# Patient Record
Sex: FEMALE | Race: BLACK OR AFRICAN AMERICAN | NOT HISPANIC OR LATINO | Employment: FULL TIME | URBAN - METROPOLITAN AREA
[De-identification: names, ages, dates, MRNs, and addresses within clinical notes are randomized per-mention and may not be internally consistent; named-entity substitution may affect disease eponyms.]

---

## 2021-12-17 ENCOUNTER — IMMUNIZATION (OUTPATIENT)
Dept: PRIMARY CARE CLINIC | Facility: CLINIC | Age: 61
End: 2021-12-17
Payer: COMMERCIAL

## 2021-12-17 DIAGNOSIS — Z23 NEED FOR VACCINATION: Primary | ICD-10-CM

## 2021-12-17 PROCEDURE — 0064A COVID-19, MRNA, LNP-S, PF, 100 MCG/0.25 ML DOSE VACCINE (MODERNA BOOSTER): CPT | Mod: PBBFAC,CV19 | Performed by: INTERNAL MEDICINE

## 2022-03-31 ENCOUNTER — OFFICE VISIT (OUTPATIENT)
Dept: URGENT CARE | Facility: CLINIC | Age: 62
End: 2022-03-31
Payer: COMMERCIAL

## 2022-03-31 VITALS
DIASTOLIC BLOOD PRESSURE: 70 MMHG | OXYGEN SATURATION: 98 % | TEMPERATURE: 99 F | HEIGHT: 67 IN | SYSTOLIC BLOOD PRESSURE: 116 MMHG | HEART RATE: 68 BPM | WEIGHT: 232 LBS | RESPIRATION RATE: 18 BRPM | BODY MASS INDEX: 36.41 KG/M2

## 2022-03-31 DIAGNOSIS — K42.9 UMBILICAL HERNIA WITHOUT OBSTRUCTION OR GANGRENE: Primary | ICD-10-CM

## 2022-03-31 DIAGNOSIS — R10.9 ABDOMINAL PAIN, UNSPECIFIED ABDOMINAL LOCATION: ICD-10-CM
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
## 2022-03-31 LAB
BILIRUB UR QL STRIP: NEGATIVE
GLUCOSE UR QL STRIP: NEGATIVE
KETONES UR QL STRIP: NEGATIVE
LEUKOCYTE ESTERASE UR QL STRIP: NEGATIVE
PH, POC UA: 5 (ref 5–8)
POC BLOOD, URINE: POSITIVE
POC NITRATES, URINE: NEGATIVE
PROT UR QL STRIP: NEGATIVE
SP GR UR STRIP: 1.02 (ref 1–1.03)
UROBILINOGEN UR STRIP-ACNC: NORMAL (ref 0.1–1.1)

## 2022-03-31 PROCEDURE — 74019 RADEX ABDOMEN 2 VIEWS: CPT | Mod: FY,S$GLB,, | Performed by: RADIOLOGY

## 2022-03-31 PROCEDURE — 74019 XR ABDOMEN FLAT AND ERECT: ICD-10-PCS | Mod: FY,S$GLB,, | Performed by: RADIOLOGY

## 2022-03-31 PROCEDURE — 99204 PR OFFICE/OUTPT VISIT, NEW, LEVL IV, 45-59 MIN: ICD-10-PCS | Mod: S$GLB,,,

## 2022-03-31 PROCEDURE — 81003 POCT URINALYSIS, DIPSTICK, AUTOMATED, W/O SCOPE: ICD-10-PCS | Mod: QW,S$GLB,,

## 2022-03-31 PROCEDURE — 81003 URINALYSIS AUTO W/O SCOPE: CPT | Mod: QW,S$GLB,,

## 2022-03-31 PROCEDURE — 99204 OFFICE O/P NEW MOD 45 MIN: CPT | Mod: S$GLB,,,

## 2022-03-31 RX ORDER — KETOROLAC TROMETHAMINE 5 MG/ML
SOLUTION OPHTHALMIC
COMMUNITY
Start: 2021-05-07

## 2022-03-31 RX ORDER — OFLOXACIN 3 MG/ML
SOLUTION/ DROPS OPHTHALMIC
COMMUNITY
Start: 2021-05-07

## 2022-03-31 RX ORDER — CHOLECALCIFEROL (VITAMIN D3) 25 MCG
1000 TABLET ORAL
COMMUNITY

## 2022-03-31 NOTE — LETTER
[facility]  [address] [address]  Phone: [phone]  Fax: [phone]  [facility]: [id number]    Pt Name: Ruby William  Injury Date: 03/30/2022   Employee ID:  Date of First Treatment: 03/31/2022   Company: [employer]      Appointment Time: 05:40 PM Arrived:    Provider: Kelley Ferguson PA-C Time Out:     Office Treatment:     1. Umbilical hernia without obstruction or gangrene    2. Abdominal pain, unspecified abdominal location    3. Work related injury                     [place] [facility] [place]

## 2022-03-31 NOTE — PROGRESS NOTES
Subjective:       Patient ID: Ruby William is a 62 y.o. female.    Chief Complaint: Hernia    Pt complains of umbilical hernia after lifting a heavy backpack at work, incident occurred yesterday night.     Provider note starts below:  Patient presents with complaint of aggravation of her umbilical hernia after living heavy backpack at work last night.  She states she had immediate sharp pain in the center of her abdomen that is now an aching pain.  The pain is worse when she moves.  She has tried Tylenol but this has not relieved her symptoms.  She denies any nausea or vomiting and she has had a normal bowel movement this morning.  Has not passed gas but states this is normal for her.  She has history of gallstone removal with incision through her umbilicus.    Abdominal Pain  This is a new problem. The current episode started yesterday. The onset quality is sudden. The pain is located in the periumbilical region. The pain is at a severity of 7/10. The quality of the pain is aching. Pertinent negatives include no constipation, diarrhea, fever, nausea or vomiting. The pain is aggravated by movement. She has tried acetaminophen for the symptoms. The treatment provided moderate relief.       Constitution: Negative for chills and fever.   Gastrointestinal: Positive for abdominal pain. Negative for nausea, vomiting, constipation, diarrhea, dark colored stools, rectal bleeding and rectal pain.        Objective:      Physical Exam  Vitals and nursing note reviewed.   Constitutional:       General: She is not in acute distress.     Appearance: She is not ill-appearing or toxic-appearing.   HENT:      Head: Normocephalic and atraumatic.      Nose: Nose normal.   Eyes:      Extraocular Movements: Extraocular movements intact.      Pupils: Pupils are equal, round, and reactive to light.   Cardiovascular:      Rate and Rhythm: Normal rate and regular rhythm.      Pulses: Normal pulses.      Heart sounds: Normal heart  sounds.   Pulmonary:      Effort: Pulmonary effort is normal. No respiratory distress.      Breath sounds: Normal breath sounds. No wheezing.   Abdominal:      General: Bowel sounds are normal. There is no distension. There are no signs of injury.      Palpations: Abdomen is soft.      Tenderness: There is abdominal tenderness. There is no guarding or rebound.      Hernia: A hernia is present. Hernia is present in the umbilical area.          Comments: No guarding or rebound   Musculoskeletal:         General: Normal range of motion.      Cervical back: Normal range of motion.   Skin:     General: Skin is warm and dry.      Capillary Refill: Capillary refill takes less than 2 seconds.   Neurological:      General: No focal deficit present.      Mental Status: She is alert and oriented to person, place, and time.   Psychiatric:         Mood and Affect: Mood normal.         Behavior: Behavior normal.       XR ABDOMEN FLAT AND ERECT    Result Date: 3/31/2022  EXAMINATION: XR ABDOMEN FLAT AND ERECT CLINICAL HISTORY: Unspecified abdominal pain TECHNIQUE: Flat and erect AP views of the abdomen were performed. COMPARISON: None FINDINGS: One upright view, 1 supine view. No significant air-fluid levels on upright view.  Air and stool is seen within the large bowel and projected over the rectum.  No focally dilated small bowel loops.  There is moderate stool throughout the colon.  There are no calcifications to suggest nephrolithiasis or cholelithiasis.  No large volume free air or pneumatosis.  The lower lung zones are grossly clear.     1. Nonobstructive bowel gas pattern, moderate stool in the colon. Electronically signed by: Mohinder Levi MD Date:    03/31/2022 Time:    20:22    Assessment:       1. Umbilical hernia without obstruction or gangrene    2. Abdominal pain, unspecified abdominal location    3. Work related injury        Plan:     imaging reviewed. No immediate emergent evaluation needed at this time.  Instructed patient to FU with Occupational medicine for return to work restrictions.     Umbilical hernia without obstruction or gangrene   -Referral/consult to Occupational Medicine    Abdominal pain, unspecified abdominal location   -Referral/consult to Occupational Medicine  Work related injury    -Referral/consult to Occupational Medicine        Patient Instructions   PLEASE READ ALL DISCHARGE INSTRUCTIONS      - Acetaminophen (tylenol) or Ibuprofen (advil,motrin) as directed as needed for fever/pain. Avoid tylenol if you have a history of liver disease. Do not take ibuprofen if you have a history of GI bleeding, kidney disease, or if you take blood thinners.     - You must understand that you have received an Urgent Care treatment only and that you may be released before all of your medical problems are known or treated.   - You, the patient, will arrange for follow up care as instructed.   - If your condition worsens or fails to improve we recommend that you receive another evaluation at the ER immediately (nausea, vomiting, worsening abdominal pain, unable to pass stool or any other worsening symptoms)  - Follow up with your PCP or specialty clinic as directed in the next 1-2 weeks if not improved or as needed.  You can call [phone] to schedule an appointment with the appropriate provider.        General Referral to [facility]  You were referred to [facility] for Follow-up Care.  Please call either of the numbers below to schedule your appointment:  -  [place]:   [phone]     -  [facility]:  [phone]  -  [place]:  [phone]  -  [facility]: [phone]    Please go to the Emergency Department for any concerns or worsening of condition.  Please follow up with your primary care doctor or specialist in the next 48-72hrs as needed.    If you  smoke, please stop smoking.            No follow-ups on file.

## 2022-04-01 NOTE — PATIENT INSTRUCTIONS
PLEASE READ ALL DISCHARGE INSTRUCTIONS      - Acetaminophen (tylenol) or Ibuprofen (advil,motrin) as directed as needed for fever/pain. Avoid tylenol if you have a history of liver disease. Do not take ibuprofen if you have a history of GI bleeding, kidney disease, or if you take blood thinners.     - You must understand that you have received an Urgent Care treatment only and that you may be released before all of your medical problems are known or treated.   - You, the patient, will arrange for follow up care as instructed.   - If your condition worsens or fails to improve we recommend that you receive another evaluation at the ER immediately (nausea, vomiting, worsening abdominal pain, unable to pass stool or any other worsening symptoms)  - Follow up with your PCP or specialty clinic as directed in the next 1-2 weeks if not improved or as needed.  You can call [phone] to schedule an appointment with the appropriate provider.        General Referral to [facility]  You were referred to [facility] for Follow-up Care.  Please call either of the numbers below to schedule your appointment:  -  [place]:   [phone]     -  [place]:  [phone]  -  [place]:  [phone]  -  [facility]: [phone]    Please go to the Emergency Department for any concerns or worsening of condition.  Please follow up with your primary care doctor or specialist in the next 48-72hrs as needed.    If you  smoke, please stop smoking.

## 2022-04-04 ENCOUNTER — OFFICE VISIT (OUTPATIENT)
Dept: URGENT CARE | Facility: CLINIC | Age: 62
End: 2022-04-04
Payer: COMMERCIAL

## 2022-04-04 VITALS
TEMPERATURE: 99 F | WEIGHT: 232 LBS | HEIGHT: 67 IN | HEART RATE: 62 BPM | BODY MASS INDEX: 36.41 KG/M2 | DIASTOLIC BLOOD PRESSURE: 81 MMHG | SYSTOLIC BLOOD PRESSURE: 129 MMHG | OXYGEN SATURATION: 100 %

## 2022-04-04 DIAGNOSIS — K42.9 UMBILICAL HERNIA WITHOUT OBSTRUCTION AND WITHOUT GANGRENE: Primary | ICD-10-CM

## 2022-04-04 DIAGNOSIS — Z02.6 ENCOUNTER RELATED TO WORKER'S COMPENSATION CLAIM: ICD-10-CM

## 2022-04-04 PROCEDURE — 99203 PR OFFICE/OUTPT VISIT, NEW, LEVL III, 30-44 MIN: ICD-10-PCS | Mod: S$GLB,,, | Performed by: NURSE PRACTITIONER

## 2022-04-04 PROCEDURE — 99203 OFFICE O/P NEW LOW 30 MIN: CPT | Mod: S$GLB,,, | Performed by: NURSE PRACTITIONER

## 2022-04-04 NOTE — LETTER
[facility]  [address] [address]  Phone: [phone]  Fax: [phone]  [facility]: [id number]    Pt Name: Ruby William  Injury Date: 03/30/2022   Employee ID: [id number] Date of First Treatment: 04/04/2022   Company: [employer]      Appointment Time: 01:45 PM Arrived: 1:40 PM   Provider: DOMENICA Mast Time Out: 4:00 PM     Office Treatment:   1. Umbilical hernia without obstruction and without gangrene    2. Encounter related to worker's compensation claim          Patient Instructions: Attention not to aggravate affected area, Referral to specialist to be scheduled, once authorized      Restrictions: No lifting/pushing/pulling more than 10 lbs, Regular Duty     Return Appointment: 4/11/2022 at 10:30 AM JACQUIE

## 2022-04-04 NOTE — PROGRESS NOTES
Subjective:       Patient ID: Ruby William is a 62 y.o. female.    Chief Complaint: Hernia    NV, WC, (DOI 3/30/22) works for the [employer] and was lifting a knapsack and fel the pain at the time. She was diagnosed with a hernia before and believes she irritated while at work.  No other symptoms except for lying flat on her back but that was the first night. Her company just wanted to have her checked out since the incident occurred at work. 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 She does not do heavy lifting at work on regular basis. She states someone left a bag and she went to pick t up when it occurred.       Constitution: Negative.   HENT: Negative.    Cardiovascular: Negative.    Respiratory: Negative.    Gastrointestinal: Positive for abdominal pain and constipation. Negative for abdominal trauma, abdominal bloating, history of abdominal surgery, nausea, vomiting, heartburn and bowel incontinence.   Endocrine: negative.   Genitourinary: Negative.  Negative for frequency and urgency.   Musculoskeletal: Negative.  Negative for pain.   Skin: Negative.  Negative for color change and rash.   Allergic/Immunologic: Negative.    Neurological: Negative.  Negative for numbness and tingling.   Hematologic/Lymphatic: Negative.    Psychiatric/Behavioral: Negative.         Objective:      Physical Exam  Vitals and nursing note reviewed.   Constitutional:       Appearance: She is well-developed.   HENT:      Head: Normocephalic and atraumatic.      Right Ear: External ear normal.      Left Ear: External ear normal.      Nose: Nose normal.   Eyes:      Conjunctiva/sclera: Conjunctivae normal.      Pupils: Pupils are equal, round, and reactive to light.   Cardiovascular:      Rate and Rhythm: Normal rate and regular rhythm.      Heart sounds: Normal heart sounds.   Abdominal:      General: Bowel sounds are  normal.      Palpations: Abdomen is soft.      Hernia: A hernia is present.       Musculoskeletal:         General: Normal range of motion.      Cervical back: Normal range of motion and neck supple.   Skin:     General: Skin is warm and dry.      Capillary Refill: Capillary refill takes less than 2 seconds.   Neurological:      Mental Status: She is alert and oriented to person, place, and time.   Psychiatric:         Behavior: Behavior normal.         Thought Content: Thought content normal.         Judgment: Judgment normal.         Assessment:       1. Umbilical hernia without obstruction and without gangrene    2. Encounter related to worker's compensation claim        Plan:            Patient Instructions: Attention not to aggravate affected area, Referral to specialist to be scheduled, once authorized   Restrictions: No lifting/pushing/pulling more than 10 lbs, Regular Duty  Follow up in about 1 week (around 4/11/2022).

## 2022-04-11 ENCOUNTER — OFFICE VISIT (OUTPATIENT)
Dept: URGENT CARE | Facility: CLINIC | Age: 62
End: 2022-04-11
Payer: COMMERCIAL

## 2022-04-11 DIAGNOSIS — K42.9 UMBILICAL HERNIA WITHOUT OBSTRUCTION AND WITHOUT GANGRENE: ICD-10-CM

## 2022-04-11 DIAGNOSIS — R31.9 HEMATURIA, UNSPECIFIED TYPE: Primary | ICD-10-CM

## 2022-04-11 LAB
BILIRUB UR QL STRIP: NEGATIVE
GLUCOSE UR QL STRIP: NEGATIVE
KETONES UR QL STRIP: NEGATIVE
LEUKOCYTE ESTERASE UR QL STRIP: NEGATIVE
PH, POC UA: 6
POC BLOOD, URINE: NEGATIVE
POC NITRATES, URINE: NEGATIVE
PROT UR QL STRIP: NEGATIVE
SP GR UR STRIP: 1.02 (ref 1–1.03)
UROBILINOGEN UR STRIP-ACNC: NORMAL (ref 0.1–1.1)

## 2022-04-11 PROCEDURE — 99214 PR OFFICE/OUTPT VISIT, EST, LEVL IV, 30-39 MIN: ICD-10-PCS | Mod: S$GLB,,, | Performed by: NURSE PRACTITIONER

## 2022-04-11 PROCEDURE — 81003 POCT URINALYSIS, DIPSTICK, AUTOMATED, W/O SCOPE: ICD-10-PCS | Mod: QW,S$GLB,, | Performed by: NURSE PRACTITIONER

## 2022-04-11 PROCEDURE — 99214 OFFICE O/P EST MOD 30 MIN: CPT | Mod: S$GLB,,, | Performed by: NURSE PRACTITIONER

## 2022-04-11 PROCEDURE — 81003 URINALYSIS AUTO W/O SCOPE: CPT | Mod: QW,S$GLB,, | Performed by: NURSE PRACTITIONER

## 2022-04-11 NOTE — LETTER
[facility] - [facility]ational [facility]  [address] [address]  Phone: [phone]  Fax: [phone]  [facility]: [id number]     Name: Ruby William  Injury Date: 03/30/2022   Employee ID: [id number] Date of Treatment: 04/11/2022   Company: [employer]      Appointment Time: 10:15 AM Arrived: 10:30 AM   Provider: DOMENICA Mast Time Out: 11:25 AM     Office Treatment:   1. Hematuria, unspecified type    2. Umbilical hernia without obstruction and without gangrene          Patient Instructions: Attention not to aggravate affected area, Referral to specialist to be scheduled, once authorized      Restrictions: No lifting/pushing/pulling more than 10 lbs, Avoid frequent bending/lifting/twisting     Return Appointment: 4/28/2022 at 10:15 AM JACQUIE

## 2022-04-11 NOTE — PROGRESS NOTES
Subjective:       Patient ID: Ruby William is a 62 y.o. female.    Chief Complaint: hernia    RV, WC, (DOI 3/30/22) works for the [employer] and was lifting a knapsack and felt the pain at the time. She was diagnosed with a hernia. Patient is feeling like she still has pain. She takes the Tylenol for pain. The pain comes and goes. Pain 6/10. [facility] [place] [place]    She states she normally has regular bowel movements from eating raisin bran daily but over the last several days she has been constipated. In addition, she had blood in urine last visit       Constitution: Negative.   HENT: Negative.    Cardiovascular: Negative.    Respiratory: Negative.    Gastrointestinal: Positive for abdominal pain and constipation. Negative for abdominal trauma, abdominal bloating, history of abdominal surgery, nausea, vomiting, heartburn and bowel incontinence.   Endocrine: negative.   Genitourinary: Negative.  Negative for frequency and urgency.   Musculoskeletal: Negative.  Negative for pain.   Skin: Negative.  Negative for color change and rash.   Allergic/Immunologic: Negative.    Neurological: Negative.  Negative for numbness and tingling.   Hematologic/Lymphatic: Negative.    Psychiatric/Behavioral: Negative.         Objective:      Physical Exam  Vitals and nursing note reviewed.   Constitutional:       Appearance: She is well-developed.   HENT:      Head: Normocephalic and atraumatic.      Right Ear: External ear normal.      Left Ear: External ear normal.      Nose: Nose normal.   Eyes:      Conjunctiva/sclera: Conjunctivae normal.      Pupils: Pupils are equal, round, and reactive to light.   Cardiovascular:      Rate and Rhythm: Normal rate and regular rhythm.      Heart sounds: Normal heart sounds.   Abdominal:      General: Bowel sounds are normal.      Palpations: Abdomen is soft.      Hernia: A hernia is present.       Musculoskeletal:         General: Normal range of motion.      Cervical back: Normal  range of motion and neck supple.   Skin:     General: Skin is warm and dry.      Capillary Refill: Capillary refill takes less than 2 seconds.   Neurological:      Mental Status: She is alert and oriented to person, place, and time.   Psychiatric:         Behavior: Behavior normal.         Thought Content: Thought content normal.         Judgment: Judgment normal.         Results for orders placed or performed in visit on 04/11/22   POCT Urinalysis, Dipstick, Automated, W/O Scope   Result Value Ref Range    POC Blood, Urine Negative Negative    POC Bilirubin, Urine Negative Negative    POC Urobilinogen, Urine Normal 0.1 - 1.1    POC Ketones, Urine Negative Negative    POC Protein, Urine Negative Negative    POC Nitrates, Urine Negative Negative    POC Glucose, Urine Negative Negative    pH, UA 6.0     POC Specific Gravity, Urine 1.020 1.003 - 1.029    POC Leukocytes, Urine Negative Negative       Assessment:       1. Hematuria, unspecified type    2. Umbilical hernia without obstruction and without gangrene        Plan:       Add miralax to daily regime  Referral still pending authorization  Will set visit for two weeks to ensure she has been seen by general surgery     Patient Instructions: Attention not to aggravate affected area, Referral to specialist to be scheduled, once authorized   Restrictions: No lifting/pushing/pulling more than 10 lbs, Avoid frequent bending/lifting/twisting  Follow up in about 2 weeks (around 4/25/2022) for call to cancel if has appt with general surgery.

## 2022-04-28 ENCOUNTER — OFFICE VISIT (OUTPATIENT)
Dept: URGENT CARE | Facility: CLINIC | Age: 62
End: 2022-04-28
Payer: COMMERCIAL

## 2022-04-28 DIAGNOSIS — K42.9 UMBILICAL HERNIA WITHOUT OBSTRUCTION AND WITHOUT GANGRENE: ICD-10-CM

## 2022-04-28 DIAGNOSIS — Z02.6 ENCOUNTER RELATED TO WORKER'S COMPENSATION CLAIM: Primary | ICD-10-CM

## 2022-04-28 PROCEDURE — 99214 OFFICE O/P EST MOD 30 MIN: CPT | Mod: S$GLB,,, | Performed by: PREVENTIVE MEDICINE

## 2022-04-28 PROCEDURE — 99214 PR OFFICE/OUTPT VISIT, EST, LEVL IV, 30-39 MIN: ICD-10-PCS | Mod: S$GLB,,, | Performed by: PREVENTIVE MEDICINE

## 2022-04-28 NOTE — LETTER
[facility]  [address] [address]  Phone: [phone]  Fax: [phone]  [facility]: [id number]    Pt Name: Ruby William  Injury Date: 03/30/2022   Employee ID: [id number] Date of Treatment: 04/28/2022   Company: [employer]      Appointment Time: 10:15 AM Arrived: 10:15 AM   Provider: Sg Porter MD Time Out: 10:58 AM     Office Treatment:   1. Encounter related to worker's compensation claim    2. Umbilical hernia without obstruction and without gangrene          Patient Instructions:  (Patient will return to clinic if her symptoms reoccur)      Restrictions: Regular Duty, Discharged from Occupational Health     Return As needed. JACQUIE

## 2022-04-28 NOTE — PROGRESS NOTES
Subjective:       Patient ID: Ruby William is a 62 y.o. female.    Chief Complaint: Umbilical Hernia    WC Follow-up of Umbilical Hernia ( DOI 03-30-22 ) Pain score 0/10 with No meds and No complaints. SH      Constitution: Negative.   HENT: Negative.    Neck: neck negative.   Cardiovascular: Negative.    Eyes: Negative.    Respiratory: Negative.    Gastrointestinal: Negative.  Negative for abdominal pain.   Endocrine: negative.   Genitourinary: Negative.    Musculoskeletal: Negative for pain, trauma, abnormal ROM of joint, back pain, muscle cramps and muscle ache.   Skin: Negative.    Allergic/Immunologic: Negative.    Neurological: Negative for numbness and tingling.   Hematologic/Lymphatic: Negative.         Objective:      Physical Exam  Vitals and nursing note reviewed.   Constitutional:       Appearance: She is well-developed.   HENT:      Head: Normocephalic.   Eyes:      Pupils: Pupils are equal, round, and reactive to light.   Cardiovascular:      Rate and Rhythm: Normal rate.   Pulmonary:      Effort: Pulmonary effort is normal.   Abdominal:      General: Abdomen is flat. Bowel sounds are normal.      Palpations: Abdomen is soft.      Tenderness: There is no abdominal tenderness.      Hernia: A hernia is present. Hernia is present in the umbilical area.          Comments: Small umbilical hernia persists with no pain on palpation at this time.   Musculoskeletal:      Cervical back: Normal range of motion.      Lumbar back: No swelling, edema, deformity, lacerations, spasms or bony tenderness.   Skin:     General: Skin is warm.   Neurological:      Mental Status: She is alert.      Comments: No focal neurologic deficits         Assessment:       1. Encounter related to worker's compensation claim    2. Umbilical hernia without obstruction and without gangrene        Plan:       Patient states that her abdominal pain at the site of her umbilical hernia has resolved.  She prefers no surgical  intervention at this time.  She indicates that she has had this for quite some time and it does become aggravated and more painful with lifting.  She therefore will maintain her work that primarily does not involve any heavy lifting.     Patient Instructions:  (Patient will return to clinic if her symptoms reoccur)   Restrictions: Regular Duty, Discharged from Occupational Health  Follow up if symptoms worsen or fail to improve.

## 2022-06-16 ENCOUNTER — IMMUNIZATION (OUTPATIENT)
Dept: PHARMACY | Facility: CLINIC | Age: 62
End: 2022-06-16
Payer: MEDICAID

## 2022-06-16 DIAGNOSIS — Z23 NEED FOR VACCINATION: Primary | ICD-10-CM

## 2022-11-29 ENCOUNTER — OFFICE VISIT (OUTPATIENT)
Dept: URGENT CARE | Facility: CLINIC | Age: 62
End: 2022-11-29
Payer: MEDICAID

## 2022-11-29 VITALS
WEIGHT: 232 LBS | BODY MASS INDEX: 36.41 KG/M2 | HEIGHT: 67 IN | SYSTOLIC BLOOD PRESSURE: 139 MMHG | HEART RATE: 72 BPM | OXYGEN SATURATION: 97 % | RESPIRATION RATE: 20 BRPM | DIASTOLIC BLOOD PRESSURE: 79 MMHG | TEMPERATURE: 99 F

## 2022-11-29 DIAGNOSIS — R05.9 COUGH, UNSPECIFIED TYPE: ICD-10-CM

## 2022-11-29 DIAGNOSIS — U07.1 COVID-19: Primary | ICD-10-CM

## 2022-11-29 DIAGNOSIS — U07.1 COVID-19 VIRUS DETECTED: ICD-10-CM

## 2022-11-29 LAB
CTP QC/QA: YES
CTP QC/QA: YES
POC MOLECULAR INFLUENZA A AGN: NEGATIVE
POC MOLECULAR INFLUENZA B AGN: NEGATIVE
SARS-COV-2 AG RESP QL IA.RAPID: POSITIVE

## 2022-11-29 PROCEDURE — 87811 SARS-COV-2 COVID19 W/OPTIC: CPT | Mod: QW,S$GLB,, | Performed by: PHYSICIAN ASSISTANT

## 2022-11-29 PROCEDURE — 3008F BODY MASS INDEX DOCD: CPT | Mod: CPTII,S$GLB,, | Performed by: PHYSICIAN ASSISTANT

## 2022-11-29 PROCEDURE — 87502 INFLUENZA DNA AMP PROBE: CPT | Mod: QW,S$GLB,, | Performed by: PHYSICIAN ASSISTANT

## 2022-11-29 PROCEDURE — 87811 SARS CORONAVIRUS 2 ANTIGEN POCT, MANUAL READ: ICD-10-PCS | Mod: QW,S$GLB,, | Performed by: PHYSICIAN ASSISTANT

## 2022-11-29 PROCEDURE — 1159F MED LIST DOCD IN RCRD: CPT | Mod: CPTII,S$GLB,, | Performed by: PHYSICIAN ASSISTANT

## 2022-11-29 PROCEDURE — 99213 OFFICE O/P EST LOW 20 MIN: CPT | Mod: S$GLB,,, | Performed by: PHYSICIAN ASSISTANT

## 2022-11-29 PROCEDURE — 3075F PR MOST RECENT SYSTOLIC BLOOD PRESS GE 130-139MM HG: ICD-10-PCS | Mod: CPTII,S$GLB,, | Performed by: PHYSICIAN ASSISTANT

## 2022-11-29 PROCEDURE — 3075F SYST BP GE 130 - 139MM HG: CPT | Mod: CPTII,S$GLB,, | Performed by: PHYSICIAN ASSISTANT

## 2022-11-29 PROCEDURE — 3008F PR BODY MASS INDEX (BMI) DOCUMENTED: ICD-10-PCS | Mod: CPTII,S$GLB,, | Performed by: PHYSICIAN ASSISTANT

## 2022-11-29 PROCEDURE — 1160F PR REVIEW ALL MEDS BY PRESCRIBER/CLIN PHARMACIST DOCUMENTED: ICD-10-PCS | Mod: CPTII,S$GLB,, | Performed by: PHYSICIAN ASSISTANT

## 2022-11-29 PROCEDURE — 87502 POCT INFLUENZA A/B MOLECULAR: ICD-10-PCS | Mod: QW,S$GLB,, | Performed by: PHYSICIAN ASSISTANT

## 2022-11-29 PROCEDURE — 1160F RVW MEDS BY RX/DR IN RCRD: CPT | Mod: CPTII,S$GLB,, | Performed by: PHYSICIAN ASSISTANT

## 2022-11-29 PROCEDURE — 3078F PR MOST RECENT DIASTOLIC BLOOD PRESSURE < 80 MM HG: ICD-10-PCS | Mod: CPTII,S$GLB,, | Performed by: PHYSICIAN ASSISTANT

## 2022-11-29 PROCEDURE — 99213 PR OFFICE/OUTPT VISIT, EST, LEVL III, 20-29 MIN: ICD-10-PCS | Mod: S$GLB,,, | Performed by: PHYSICIAN ASSISTANT

## 2022-11-29 PROCEDURE — 3078F DIAST BP <80 MM HG: CPT | Mod: CPTII,S$GLB,, | Performed by: PHYSICIAN ASSISTANT

## 2022-11-29 PROCEDURE — 1159F PR MEDICATION LIST DOCUMENTED IN MEDICAL RECORD: ICD-10-PCS | Mod: CPTII,S$GLB,, | Performed by: PHYSICIAN ASSISTANT

## 2022-11-29 RX ORDER — BENZONATATE 200 MG/1
200 CAPSULE ORAL 3 TIMES DAILY PRN
Qty: 30 CAPSULE | Refills: 0 | Status: SHIPPED | OUTPATIENT
Start: 2022-11-29 | End: 2022-12-09

## 2022-11-29 RX ORDER — LEVOTHYROXINE SODIUM 112 UG/1
112 TABLET ORAL EVERY MORNING
COMMUNITY
Start: 2022-09-28

## 2022-11-29 RX ORDER — FLUTICASONE PROPIONATE 50 MCG
1 SPRAY, SUSPENSION (ML) NASAL DAILY
Qty: 16 G | Refills: 0 | Status: SHIPPED | OUTPATIENT
Start: 2022-11-29 | End: 2022-12-06

## 2022-11-29 NOTE — LETTER
November 29, 2022      [facility]  [address] [address]  Phone: [phone]  Fax: [phone]       Patient: Ruby William   YOB: 1960  Date of Visit: 11/29/2022    To Whom It May Concern:    Lucas William  was at [facility] on 11/29/2022. The patient may return to work on 12/03/2022 with no restrictions. If you have any questions or concerns, or if I can be of further assistance, please do not hesitate to contact me.    Sincerely,    Netta Cornejo PA-C

## 2022-11-30 NOTE — PATIENT INSTRUCTIONS
You have tested positive for COVID-19 today.  Use flonase for nasal congestion, paxlovid as directed for covid, and tessalon for cough. If your coughing at night please take a tessalon before bed.     ISOLATION  If you tested positive and do not have symptoms, you must isolate for 5 days starting on the day of the positive test. I    If you tested positive and have symptoms, you must isolate for 5 days starting on the day of the first symptoms,  not the day of the positive test.     Both the CDC and the [facility] emphasize that you do not test out of isolation.     After 5 days, if your symptoms have improved and you have not had fever on day 5, you can return to the community on day 6- NO TESTING REQUIRED!      In fact, we do not retest if you were positive in the last 90 days.    After your 5 days of isolation are completed, the CDC recommends strict mask use for the first 5 days that you come out of isolation.

## 2022-11-30 NOTE — PROGRESS NOTES
"Subjective:       Patient ID: Ruby William is a 62 y.o. female.    Vitals:  height is 5' 7" (1.702 m) and weight is 105.2 kg (232 lb). Her temperature is 99.1 °F (37.3 °C). Her blood pressure is 139/79 and her pulse is 72. Her respiration is 20 and oxygen saturation is 97%.     Chief Complaint: Sore Throat (cough)    62 yr female present with sore throat, cough , headache. Symptoms started Sunday night. Treatments at home include Mucinex with mild relief. Vaccinated for COVID. Never had COVID    Sore Throat   This is a new problem. The current episode started in the past 7 days. The problem has been gradually worsening. There has been no fever. The pain is at a severity of 8/10. The pain is moderate. Associated symptoms include congestion, coughing and headaches. Pertinent negatives include no abdominal pain, diarrhea, ear discharge, ear pain, plugged ear sensation, neck pain, shortness of breath, stridor, swollen glands, trouble swallowing or vomiting. She has had no exposure to strep or mono. Treatments tried: Mucinex. The treatment provided mild relief.     Constitution: Positive for fatigue. Negative for appetite change, chills, sweating and fever.   HENT:  Positive for congestion, postnasal drip and sore throat. Negative for ear pain, ear discharge, mouth sores, tongue pain, tongue lesion, sinus pain, sinus pressure and trouble swallowing.    Neck: Negative for neck pain and neck stiffness.   Cardiovascular:  Negative for chest pain and sob on exertion.   Eyes:  Negative for eye discharge and eye itching.   Respiratory:  Positive for cough. Negative for chest tightness, sputum production, shortness of breath, stridor and wheezing.    Gastrointestinal:  Negative for abdominal pain, nausea, vomiting, constipation and diarrhea.   Musculoskeletal:  Negative for pain, muscle cramps and muscle ache.   Skin:  Negative for color change, pale and rash.   Neurological:  Positive for headaches. Negative for " dizziness, disorientation and altered mental status.   Psychiatric/Behavioral:  Negative for altered mental status, disorientation, confusion and agitation.    Past Medical History:   Diagnosis Date    Chronic constipation     Hematuria     History of adenomatous polyp of colon     Hypothyroidism     Obesity        Past Surgical History:   Procedure Laterality Date    BACK SURGERY  07/01/2012    CATARACT EXTRACTION Bilateral     CHOLECYSTECTOMY      COLONOSCOPY N/A 11/23/2015    Procedure: COLONOSCOPY;  Surgeon: Tanvir Hood MD;  Location: [address]);  Service: Endoscopy;  Laterality: N/A;    CYSTOSCOPY W/ RETROGRADES         Family History   Problem Relation Age of Onset    Cancer Paternal Grandmother     Hypertension Unknown     Diabetes Unknown     Parkinsonism Unknown     Parkinsonism Mother     Diabetes Mother     Thyroid disease Sister        Social History     Socioeconomic History    Marital status:    Tobacco Use    Smoking status: Never    Smokeless tobacco: Never   Substance and Sexual Activity    Alcohol use: Yes     Comment: Rarely       Current Outpatient Medications   Medication Sig Dispense Refill    levothyroxine (SYNTHROID) 112 MCG tablet Take 112 mcg by mouth every morning.      benzonatate (TESSALON) 200 MG capsule Take 1 capsule (200 mg total) by mouth 3 (three) times daily as needed for Cough. 30 capsule 0    econazole nitrate 1 % cream Apply under affected nails twice daily. (Patient not taking: Reported on 3/31/2022) 30 g 0    fexofenadine (ALLEGRA) 180 MG tablet Take 1 tablet (180 mg total) by mouth once daily. 30 tablet 2    fluticasone propionate (FLONASE) 50 mcg/actuation nasal spray 1 spray (50 mcg total) by Each Nostril route once daily. for 7 days 16 g 0    ketorolac 0.5% (ACULAR) 0.5 % Drop       levothyroxine (SYNTHROID) 100 mcg injection Inject 100 mcg into the vein once daily.      nirmatrelvir-ritonavir 150-100 mg DsPk Take 1 Package by mouth 2 (two) times  daily. Take 2 tablets by mouth 2 (two) times daily for 5 days. Each dose contains 1 nirmatrelvir (pink tablet) and 1 ritonavir (white tablet). Take both tablets together for 5 days 20 tablet 0    ofloxacin (OCUFLOX) 0.3 % ophthalmic solution       ranitidine (ZANTAC) 150 MG tablet Take 1 tablet (150 mg total) by mouth 2 (two) times daily. 60 tablet 6    vitamin D (VITAMIN D3) 1000 units Tab Take 1,000 Units by mouth.       No current facility-administered medications for this visit.       Review of patient's allergies indicates:  No Known Allergies      Objective:      Physical Exam   Constitutional: She is oriented to person, place, and time. She appears well-developed. She is cooperative.  Non-toxic appearance. She does not appear ill. No distress.   HENT:   Head: Normocephalic and atraumatic.   Ears:   Right Ear: Hearing, tympanic membrane, external ear and ear canal normal. impacted cerumen  Left Ear: Hearing, tympanic membrane, external ear and ear canal normal. impacted cerumen  Nose: Rhinorrhea and congestion present. No mucosal edema or nasal deformity. No epistaxis. Right sinus exhibits no maxillary sinus tenderness and no frontal sinus tenderness. Left sinus exhibits no maxillary sinus tenderness and no frontal sinus tenderness.   Mouth/Throat: Uvula is midline and mucous membranes are normal. Mucous membranes are moist. No trismus in the jaw. Normal dentition. No uvula swelling. Posterior oropharyngeal erythema present. No oropharyngeal exudate or posterior oropharyngeal edema.   Eyes: Conjunctivae and lids are normal. Right eye exhibits no discharge. Left eye exhibits no discharge. No scleral icterus.   Neck: Trachea normal and phonation normal. Neck supple. No edema present. No erythema present. No neck rigidity present.   Cardiovascular: Normal rate, regular rhythm, normal heart sounds and normal pulses.   No murmur heard.Exam reveals no gallop.   Pulmonary/Chest: Effort normal and breath sounds  normal. No stridor. No respiratory distress. She has no decreased breath sounds. She has no wheezes. She has no rhonchi. She has no rales.   Abdominal: Normal appearance.   Musculoskeletal:      Cervical back: She exhibits no tenderness.   Lymphadenopathy:     She has no cervical adenopathy.   Neurological: She is alert and oriented to person, place, and time. She exhibits normal muscle tone. Coordination normal.   Skin: Skin is warm, dry, intact, not diaphoretic, not pale and no rash.   Psychiatric: Her speech is normal and behavior is normal. Judgment and thought content normal.   Nursing note and vitals reviewed.      Results for orders placed or performed in visit on 11/29/22   POCT Influenza A/B MOLECULAR   Result Value Ref Range    POC Molecular Influenza A Ag Negative Negative, Not Reported    POC Molecular Influenza B Ag Negative Negative, Not Reported     Acceptable Yes    SARS Coronavirus 2 Antigen, POCT Manual Read   Result Value Ref Range    SARS Coronavirus 2 Antigen Positive (A) Negative     Acceptable Yes        Assessment:       1. COVID-19    2. Cough, unspecified type          Plan:       Results reviewed  COVID-19  -     nirmatrelvir-ritonavir 150-100 mg DsPk; Take 1 Package by mouth 2 (two) times daily. Take 2 tablets by mouth 2 (two) times daily for 5 days. Each dose contains 1 nirmatrelvir (pink tablet) and 1 ritonavir (white tablet). Take both tablets together for 5 days  Dispense: 20 tablet; Refill: 0  -     benzonatate (TESSALON) 200 MG capsule; Take 1 capsule (200 mg total) by mouth 3 (three) times daily as needed for Cough.  Dispense: 30 capsule; Refill: 0  -     fluticasone propionate (FLONASE) 50 mcg/actuation nasal spray; 1 spray (50 mcg total) by Each Nostril route once daily. for 7 days  Dispense: 16 g; Refill: 0    Cough, unspecified type  -     POCT Influenza A/B MOLECULAR  -     SARS Coronavirus 2 Antigen, POCT Manual Read  -     benzonatate (TESSALON)  200 MG capsule; Take 1 capsule (200 mg total) by mouth 3 (three) times daily as needed for Cough.  Dispense: 30 capsule; Refill: 0    Patient Instructions   You have tested positive for COVID-19 today.  Use flonase for nasal congestion, paxlovid as directed for covid, and tessalon for cough. If your coughing at night please take a tessalon before bed.     ISOLATION  If you tested positive and do not have symptoms, you must isolate for 5 days starting on the day of the positive test. I    If you tested positive and have symptoms, you must isolate for 5 days starting on the day of the first symptoms,  not the day of the positive test.     Both the CDC and the [facility] emphasize that you do not test out of isolation.     After 5 days, if your symptoms have improved and you have not had fever on day 5, you can return to the community on day 6- NO TESTING REQUIRED!      In fact, we do not retest if you were positive in the last 90 days.    After your 5 days of isolation are completed, the CDC recommends strict mask use for the first 5 days that you come out of isolation.

## 2023-03-20 ENCOUNTER — IMMUNIZATION (OUTPATIENT)
Dept: PHARMACY | Facility: CLINIC | Age: 63
End: 2023-03-20
Payer: MEDICAID

## 2023-03-20 DIAGNOSIS — Z23 NEED FOR VACCINATION: Primary | ICD-10-CM

## 2023-07-26 ENCOUNTER — TELEPHONE (OUTPATIENT)
Dept: ENDOSCOPY | Facility: HOSPITAL | Age: 63
End: 2023-07-26
Payer: MEDICAID

## 2023-07-26 NOTE — TELEPHONE ENCOUNTER
Received voicemail message from Pt to schedule follow up colonoscopy. Returned Pt's call there was no answer, left a voice message requesting a call back.  See past procedure report for last colonoscopy dated 11/23/15 with Dr. Davey.